# Patient Record
Sex: FEMALE | Race: WHITE | NOT HISPANIC OR LATINO | URBAN - METROPOLITAN AREA
[De-identification: names, ages, dates, MRNs, and addresses within clinical notes are randomized per-mention and may not be internally consistent; named-entity substitution may affect disease eponyms.]

---

## 2018-06-10 ENCOUNTER — INPATIENT (INPATIENT)
Facility: HOSPITAL | Age: 53
LOS: 0 days | Discharge: HOME | DRG: 310 | End: 2018-06-11
Attending: INTERNAL MEDICINE | Admitting: INTERNAL MEDICINE
Payer: COMMERCIAL

## 2018-06-10 VITALS
DIASTOLIC BLOOD PRESSURE: 75 MMHG | WEIGHT: 293 LBS | HEART RATE: 146 BPM | OXYGEN SATURATION: 100 % | TEMPERATURE: 98 F | SYSTOLIC BLOOD PRESSURE: 146 MMHG | RESPIRATION RATE: 20 BRPM

## 2018-06-10 DIAGNOSIS — I48.91 UNSPECIFIED ATRIAL FIBRILLATION: ICD-10-CM

## 2018-06-10 LAB
ALBUMIN SERPL ELPH-MCNC: 4.6 G/DL — SIGNIFICANT CHANGE UP (ref 3.3–5)
ALP SERPL-CCNC: 82 U/L — SIGNIFICANT CHANGE UP (ref 40–120)
ALT FLD-CCNC: 22 U/L — SIGNIFICANT CHANGE UP (ref 10–45)
ANION GAP SERPL CALC-SCNC: 16 MMOL/L — SIGNIFICANT CHANGE UP (ref 5–17)
APTT BLD: 29.4 SEC — SIGNIFICANT CHANGE UP (ref 27.5–37.4)
AST SERPL-CCNC: 21 U/L — SIGNIFICANT CHANGE UP (ref 10–40)
BASOPHILS NFR BLD AUTO: 0.1 % — SIGNIFICANT CHANGE UP (ref 0–2)
BILIRUB SERPL-MCNC: 0.4 MG/DL — SIGNIFICANT CHANGE UP (ref 0.2–1.2)
BUN SERPL-MCNC: 30 MG/DL — HIGH (ref 7–23)
CALCIUM SERPL-MCNC: 10.1 MG/DL — SIGNIFICANT CHANGE UP (ref 8.4–10.5)
CHLORIDE SERPL-SCNC: 105 MMOL/L — SIGNIFICANT CHANGE UP (ref 96–108)
CK MB CFR SERPL CALC: 1.8 NG/ML — SIGNIFICANT CHANGE UP (ref 0–6.7)
CK SERPL-CCNC: 74 U/L — SIGNIFICANT CHANGE UP (ref 25–170)
CO2 SERPL-SCNC: 23 MMOL/L — SIGNIFICANT CHANGE UP (ref 22–31)
CREAT SERPL-MCNC: 0.63 MG/DL — SIGNIFICANT CHANGE UP (ref 0.5–1.3)
EOSINOPHIL NFR BLD AUTO: 0 % — SIGNIFICANT CHANGE UP (ref 0–6)
GLUCOSE SERPL-MCNC: 126 MG/DL — HIGH (ref 70–99)
HCT VFR BLD CALC: 44.8 % — SIGNIFICANT CHANGE UP (ref 34.5–45)
HGB BLD-MCNC: 14.7 G/DL — SIGNIFICANT CHANGE UP (ref 11.5–15.5)
INR BLD: 1.04 — SIGNIFICANT CHANGE UP (ref 0.88–1.16)
LIDOCAIN IGE QN: 24 U/L — SIGNIFICANT CHANGE UP (ref 7–60)
LYMPHOCYTES # BLD AUTO: 10 % — LOW (ref 13–44)
MCHC RBC-ENTMCNC: 27.4 PG — SIGNIFICANT CHANGE UP (ref 27–34)
MCHC RBC-ENTMCNC: 32.8 G/DL — SIGNIFICANT CHANGE UP (ref 32–36)
MCV RBC AUTO: 83.4 FL — SIGNIFICANT CHANGE UP (ref 80–100)
MONOCYTES NFR BLD AUTO: 1.9 % — LOW (ref 2–14)
NEUTROPHILS NFR BLD AUTO: 88 % — HIGH (ref 43–77)
NT-PROBNP SERPL-SCNC: 554 PG/ML — HIGH (ref 0–300)
PLATELET # BLD AUTO: 262 K/UL — SIGNIFICANT CHANGE UP (ref 150–400)
POTASSIUM SERPL-MCNC: 4.6 MMOL/L — SIGNIFICANT CHANGE UP (ref 3.5–5.3)
POTASSIUM SERPL-SCNC: 4.6 MMOL/L — SIGNIFICANT CHANGE UP (ref 3.5–5.3)
PROT SERPL-MCNC: 8 G/DL — SIGNIFICANT CHANGE UP (ref 6–8.3)
PROTHROM AB SERPL-ACNC: 11.5 SEC — SIGNIFICANT CHANGE UP (ref 9.8–12.7)
RBC # BLD: 5.37 M/UL — HIGH (ref 3.8–5.2)
RBC # FLD: 13 % — SIGNIFICANT CHANGE UP (ref 10.3–16.9)
SODIUM SERPL-SCNC: 144 MMOL/L — SIGNIFICANT CHANGE UP (ref 135–145)
TROPONIN T SERPL-MCNC: <0.01 NG/ML — SIGNIFICANT CHANGE UP (ref 0–0.01)
WBC # BLD: 11.4 K/UL — HIGH (ref 3.8–10.5)
WBC # FLD AUTO: 11.4 K/UL — HIGH (ref 3.8–10.5)

## 2018-06-10 PROCEDURE — 71045 X-RAY EXAM CHEST 1 VIEW: CPT | Mod: 26

## 2018-06-10 PROCEDURE — 99285 EMERGENCY DEPT VISIT HI MDM: CPT | Mod: 25

## 2018-06-10 PROCEDURE — 93010 ELECTROCARDIOGRAM REPORT: CPT | Mod: 59

## 2018-06-10 RX ORDER — HEPARIN SODIUM 5000 [USP'U]/ML
INJECTION INTRAVENOUS; SUBCUTANEOUS
Qty: 25000 | Refills: 0 | Status: DISCONTINUED | OUTPATIENT
Start: 2018-06-10 | End: 2018-06-10

## 2018-06-10 RX ORDER — HEPARIN SODIUM 5000 [USP'U]/ML
INJECTION INTRAVENOUS; SUBCUTANEOUS
Qty: 25000 | Refills: 0 | Status: DISCONTINUED | OUTPATIENT
Start: 2018-06-10 | End: 2018-06-11

## 2018-06-10 RX ORDER — SODIUM CHLORIDE 9 MG/ML
3 INJECTION INTRAMUSCULAR; INTRAVENOUS; SUBCUTANEOUS ONCE
Qty: 0 | Refills: 0 | Status: COMPLETED | OUTPATIENT
Start: 2018-06-10 | End: 2018-06-10

## 2018-06-10 RX ADMIN — SODIUM CHLORIDE 3 MILLILITER(S): 9 INJECTION INTRAMUSCULAR; INTRAVENOUS; SUBCUTANEOUS at 18:36

## 2018-06-10 RX ADMIN — HEPARIN SODIUM 1800 UNIT(S)/HR: 5000 INJECTION INTRAVENOUS; SUBCUTANEOUS at 22:30

## 2018-06-10 NOTE — H&P ADULT - PROBLEM SELECTOR PLAN 1
-New onset A-Fib with RVR in the setting of recent infection  -WBC 11.4 with left shift, auto neutrophil 88.0%  -Trop negative x 1, , D-Dimer Negative  -Pt given Cardizem 10mg IV x 2 and Cardizem 30mg PO x 1 in ED  -Started pt on Heparin gtt  -Consider EP consult in AM, NPO @ midnight for possible procedure in AM  -Echo ordered  -TFTs ordered -New onset A-Fib with RVR in the setting of recent infection  -WBC 11.4 with left shift, auto neutrophil 88.0%  -Trop negative x 1, , D-Dimer Negative  -Pt given Cardizem 10mg IV x 2 and Cardizem 30mg PO x 1 in ED  -Started pt on Heparin gtt  -Consider EP consult in AM, NPO @ midnight for possible procedure in AM  -Echo ordered  -TFTs ordered  -Monitor Telemetry overnight and EKG in AM    DVT ppx: Heparin gtt  Dispo: Admitted to [facility] for rate control and further w/u

## 2018-06-10 NOTE — ED PROVIDER NOTE - PROGRESS NOTE DETAILS
discussed with dr. ugalde, discussed with patient no contraindications to anti-coagulation, no hx of bleeding, no rectal bleeding no black/bloody stool, no brain bleeding, ulcer hx will give heparin gtt Dosing discussed with pharmacy.

## 2018-06-10 NOTE — H&P ADULT - HISTORY OF PRESENT ILLNESS
52 y/o Female presented to [facility] ED on 6/11/18 c/o sudden onset of palpitations with associated shortness of breath which began at 3:45 PM this afternoon. Pt states she was treated at an urgent care on Friday 6/8/18 for bronchitis with Doxycycline and Prednisone for chest discomfort and cough. Pt states she noted improvement of cough and SOB prior to this episode today. Pt denies chest pain, leg swelling. In ED, VS T 98, , /75, RR 20, O2 100% on RA. Labs significant for WBC 11.4 with left shift, Auto Neutrophils 88.0%, D-Dimer Negative, Trop Negative x 1, , BUN 30, Cr 0.63. EKG showed A-fib with RVR, CXR showed... Pt given Cardizem 10mg IV x 2 and Cardizem 30mg PO x 1. 52 y/o Female presented to [facility] ED on 6/11/18 c/o sudden onset of palpitations with associated shortness of breath which began at 3:45 PM this afternoon. Pt states she was treated at an urgent care on Friday 6/8/18 for bronchitis with Doxycycline and Prednisone for chest discomfort and cough. Pt states she noted improvement of cough and SOB prior to this episode today. Pt denies chest pain, leg swelling. In ED, VS T 98, , /75, RR 20, O2 100% on RA. Labs significant for WBC 11.4 with left shift, Auto Neutrophils 88.0%, D-Dimer Negative, Trop Negative x 1, , BUN 30, Cr 0.63. EKG showed A-fib with RVR, CXR showed... Pt given Cardizem 10mg IV x 2 and Cardizem 30mg PO x 1. Pt is now admitted to [facility] for further management of new onset A-Fib with RVR in the setting of recently diagnosed infection. 52 y/o Female with no significant PMHx presented to [facility] ED on 6/11/18 c/o sudden onset of palpitations with associated shortness of breath which began at 3:45 PM this afternoon. Pt states she was treated at an urgent care on Friday 6/8/18 for bronchitis with Doxycycline and Prednisone for chest discomfort and cough. Pt states she noted improvement of cough and SOB prior today's episode of palpitations and SOB. Pt returned to urgent care today and was found to be in A-Fib with RVR Pt denies chest pain, leg swelling. In ED, VS T 98, , /75, RR 20, O2 100% on RA. Labs significant for WBC 11.4 with left shift, Auto Neutrophils 88.0%, D-Dimer Negative, Trop Negative x 1, , BUN 30, Cr 0.63. EKG showed A-fib with RVR @ 132 BPM, CXR negative. Pt given Cardizem 10mg IV x 2 and Cardizem 30mg PO x 1. Pt is now admitted to [facility] for further management of new onset A-Fib with RVR in the setting of recently diagnosed infection. 52 y/o Female with no significant PMHx presented to [facility] ED on 6/11/18 c/o sudden onset of palpitations with associated shortness of breath which began at 3:45 PM this afternoon. Pt states she was treated at an urgent care on Friday 6/8/18 for bronchitis with Doxycycline and Prednisone for chest discomfort and cough. Pt states she noted improvement of cough and SOB prior today's episode of palpitations and SOB. Pt returned to urgent care today for new onset palpitations and was found to be in A-Fib with RVR. Pt denies chest pain, HA, dizziness, orthopnea/PND, dysuria, hematuria, melena, BRBPR, n/v/d, leg swelling, or any other symptoms at this time. In ED, VS T 98, , /75, RR 20, O2 100% on RA. Labs significant for WBC 11.4 with left shift, Auto Neutrophils 88.0%, D-Dimer Negative, Trop Negative x 1, , BUN 30, Cr 0.63. EKG showed A-fib with RVR @ 132 BPM, CXR negative. Pt given Cardizem 10mg IV x 2 and Cardizem 30mg PO x 1. Pt is now admitted to [facility] for further management of new onset A-Fib with RVR in the setting of recently diagnosed infection.

## 2018-06-10 NOTE — ED ADULT NURSE NOTE - OBJECTIVE STATEMENT
Patient is 52 yo F BIBA, tachycardic (HR 140s), complaining of SOB and palpitations that started at approximately 3:45PM today.  Patient denies any CP, dizziness, N/V/D, fevers, chills, cough, abdominal pain or any other complaints.  PIV placed, labs drawn and sent, EKG done, placed on cardiac monitor.  Will continue with plan of care.

## 2018-06-10 NOTE — H&P ADULT - NSHPREVIEWOFSYSTEMS_GEN_ALL_CORE
GENERAL, CONSTITUTIONAL : denies recent weight loss, fever, chills  EYES, VISION: denies changes in vision   EARS, NOSE, THROAT: denies hearing loss  HEART, CARDIOVASCULAR: denies chest pain, arrhythmia,  admits to palpitations,   RESPIRATORY:admits to productive cough which is resolving, denies SOB, wheezing, PND, orthopnea  GASTROINTESTINAL: Denies abdominal pain, heartburn, bloody stool, dark tarry stool  GENITOURINARY: Denies frequent urination, urgency  MUSCULOSKELETAL denies joint pain or swelling, restricted motion, musculoskeletal pain.   SKIN & INTEGUMENTARY Denies rashes, sores, blisters, blisters, growths.  NEUROLOGICAL: Denies numbness or tingling sensations, sensation loss, burning.   PSYCHIATRIC: Denies nervousness, anxiety, depression  ENDOCRINE Denies heat or cold intolerance, excessive thirst  HEMATOLOGIC/LYMPHATIC: Denies abnormal bleeding, bleeding of any kind

## 2018-06-10 NOTE — H&P ADULT - ASSESSMENT
52 y/o Female with no significant PMHx presented to [facility] ED on 6/11/18 c/o sudden onset of palpitations with associated shortness of breath which began at 3:45 PM this afternoon. Pt states she was treated at an urgent care on Friday 6/8/18 for bronchitis with Doxycycline and Prednisone for chest discomfort and cough. Pt states she noted improvement of cough and SOB prior today's episode of palpitations and SOB. Pt returned to urgent care today for new onset palpitations and was found to be in A-Fib with RVR. Pt is now admitted to [facility] for further management of new onset A-Fib with RVR in the setting of recently diagnosed infection.

## 2018-06-10 NOTE — ED PROVIDER NOTE - MEDICAL DECISION MAKING DETAILS
53F with concern for afib with RVR, sudden onset, etiology could potentially include recent diagnosed infection, pt did not use albuterol, no underlying cardiac dz, asymptomatic, no chest pain, trop neg, bnp slight elevation, discussed with dr. ugalde. Will send d-dimer, negative, doubt PE will admit for further w/u.

## 2018-06-10 NOTE — ED ADULT NURSE REASSESSMENT NOTE - NS ED NURSE REASSESS COMMENT FT1
Rec'd pt calm, a+o x 3, in stable condition. Pt breathing with ease on room air. Pt on cardiac monitor, noted with irregular HR ranging from 116-128per min. Denies CP/ SOB at this time. MD aware. Pt medicated as ordered. Will follow up. HL 18 G RAC/18 G L hand noted, patent. Pt admitted, awaiting dispo. Close monitoring continues.

## 2018-06-10 NOTE — ED PROVIDER NOTE - PHYSICAL EXAMINATION
gen: no acute distress, comfortable, conversant  HEENT: oropharynx clear  Neck: supple, no meningismus, no bruit noted bl carotid  CV: tachycardic, irregular  Resp: ctab, no w/c/r  Abd: nontender, no rebound/guarding  Ext: no edema, pedal pulses 2+  Neuro: alert and oriented, cn grossly intact, strength equal in all 4 ext, sensation intact to light touch f/a/l, nl coordination, gait steady

## 2018-06-10 NOTE — H&P ADULT - ATTENDING COMMENTS
A/ New onset AF in setting of recent URI/bronchitis  -plan for rate control with cardizem, anticoagulation with heparin  -Ddimer negative  -consider MARIELA/DCCV depending on tte  likely candidate for a noac prior to dc

## 2018-06-10 NOTE — H&P ADULT - NSHPPHYSICALEXAM_GEN_ALL_CORE
T(C): 36.6 (06-11-18 @ 00:38), Max: 36.7 (06-10-18 @ 18:00)  HR: 108 (06-11-18 @ 00:20) (108 - 146)  BP: 123/90 (06-11-18 @ 00:20) (123/90 - 146/75)  RR: 18 (06-11-18 @ 00:20) (17 - 20)  SpO2: 95% (06-11-18 @ 00:20) (94% - 100%)  Wt(kg): --    Appearance: Normal	  HEENT:   Normal oral mucosa, PERRL, EOMI	  Neck: Supple, - JVD; No Carotid Bruit and 2+ pulses B/L  Cardiovascular: Irregular, irregular, No JVD, No murmurs  Respiratory: Lungs clear to auscultation//No Rales, Rhonchi, Wheezing	  Gastrointestinal:  Soft, Non-tender, + BS	  Skin: No rashes, No ecchymoses, No cyanosis  Extremities: Normal range of motion, No clubbing, cyanosis or edema  Vascular: Femoral pulses 2+ b/l without bruit, DP 1+ b/l, PT 1+ b/l  Neurologic: Non-focal  Psychiatry: A & O x 3, Mood & affect appropriate

## 2018-06-10 NOTE — ED PROVIDER NOTE - OBJECTIVE STATEMENT
53F with shortness of breath, palpitations sudden onset at 3:45 PM, upgraded to me for afib with RVR. Pt denies chest pain, leg swelling. Recently visited urgent care on Friday and today, on Friday was diagnosed with bronchitis and given doxycycline prednisone for chest discomfort, cough. No cardiac hx. Felt sob and cough were improving with meds. Did not use any inhalers.

## 2018-06-11 ENCOUNTER — TRANSCRIPTION ENCOUNTER (OUTPATIENT)
Age: 53
End: 2018-06-11

## 2018-06-11 VITALS
TEMPERATURE: 97 F | DIASTOLIC BLOOD PRESSURE: 70 MMHG | HEART RATE: 89 BPM | OXYGEN SATURATION: 95 % | SYSTOLIC BLOOD PRESSURE: 136 MMHG | RESPIRATION RATE: 18 BRPM

## 2018-06-11 LAB
ALBUMIN SERPL ELPH-MCNC: 4.4 G/DL — SIGNIFICANT CHANGE UP (ref 3.3–5)
ALP SERPL-CCNC: 78 U/L — SIGNIFICANT CHANGE UP (ref 40–120)
ALT FLD-CCNC: 19 U/L — SIGNIFICANT CHANGE UP (ref 10–45)
ANION GAP SERPL CALC-SCNC: 17 MMOL/L — SIGNIFICANT CHANGE UP (ref 5–17)
APPEARANCE UR: CLEAR — SIGNIFICANT CHANGE UP
APTT BLD: 61.2 SEC — HIGH (ref 27.5–37.4)
APTT BLD: 65.9 SEC — HIGH (ref 27.5–37.4)
AST SERPL-CCNC: 19 U/L — SIGNIFICANT CHANGE UP (ref 10–40)
BILIRUB DIRECT SERPL-MCNC: <0.2 MG/DL — SIGNIFICANT CHANGE UP (ref 0–0.2)
BILIRUB INDIRECT FLD-MCNC: >0.4 MG/DL — SIGNIFICANT CHANGE UP (ref 0.2–1)
BILIRUB SERPL-MCNC: 0.6 MG/DL — SIGNIFICANT CHANGE UP (ref 0.2–1.2)
BILIRUB UR-MCNC: NEGATIVE — SIGNIFICANT CHANGE UP
BUN SERPL-MCNC: 19 MG/DL — SIGNIFICANT CHANGE UP (ref 7–23)
CALCIUM SERPL-MCNC: 10 MG/DL — SIGNIFICANT CHANGE UP (ref 8.4–10.5)
CHLORIDE SERPL-SCNC: 101 MMOL/L — SIGNIFICANT CHANGE UP (ref 96–108)
CHOLEST SERPL-MCNC: 216 MG/DL — HIGH (ref 10–199)
CHOLEST SERPL-MCNC: 218 MG/DL — HIGH (ref 10–199)
CK MB CFR SERPL CALC: 1.8 NG/ML — SIGNIFICANT CHANGE UP (ref 0–6.7)
CK SERPL-CCNC: 58 U/L — SIGNIFICANT CHANGE UP (ref 25–170)
CO2 SERPL-SCNC: 23 MMOL/L — SIGNIFICANT CHANGE UP (ref 22–31)
COLOR SPEC: YELLOW — SIGNIFICANT CHANGE UP
CREAT SERPL-MCNC: 0.5 MG/DL — SIGNIFICANT CHANGE UP (ref 0.5–1.3)
DIFF PNL FLD: ABNORMAL
GLUCOSE SERPL-MCNC: 112 MG/DL — HIGH (ref 70–99)
GLUCOSE UR QL: NEGATIVE — SIGNIFICANT CHANGE UP
HBA1C BLD-MCNC: 5.1 % — SIGNIFICANT CHANGE UP (ref 4–5.6)
HCT VFR BLD CALC: 41.6 % — SIGNIFICANT CHANGE UP (ref 34.5–45)
HDLC SERPL-MCNC: 72 MG/DL — SIGNIFICANT CHANGE UP (ref 40–125)
HDLC SERPL-MCNC: 75 MG/DL — SIGNIFICANT CHANGE UP (ref 40–125)
HGB BLD-MCNC: 14.4 G/DL — SIGNIFICANT CHANGE UP (ref 11.5–15.5)
KETONES UR-MCNC: NEGATIVE — SIGNIFICANT CHANGE UP
LEUKOCYTE ESTERASE UR-ACNC: NEGATIVE — SIGNIFICANT CHANGE UP
LIPID PNL WITH DIRECT LDL SERPL: 113 MG/DL — SIGNIFICANT CHANGE UP
LIPID PNL WITH DIRECT LDL SERPL: 116 MG/DL — SIGNIFICANT CHANGE UP
MAGNESIUM SERPL-MCNC: 2.3 MG/DL — SIGNIFICANT CHANGE UP (ref 1.6–2.6)
MCHC RBC-ENTMCNC: 28.3 PG — SIGNIFICANT CHANGE UP (ref 27–34)
MCHC RBC-ENTMCNC: 34.6 G/DL — SIGNIFICANT CHANGE UP (ref 32–36)
MCV RBC AUTO: 81.9 FL — SIGNIFICANT CHANGE UP (ref 80–100)
NITRITE UR-MCNC: NEGATIVE — SIGNIFICANT CHANGE UP
PH UR: 5.5 — SIGNIFICANT CHANGE UP (ref 5–8)
PLATELET # BLD AUTO: 248 K/UL — SIGNIFICANT CHANGE UP (ref 150–400)
POTASSIUM SERPL-MCNC: 4 MMOL/L — SIGNIFICANT CHANGE UP (ref 3.5–5.3)
POTASSIUM SERPL-SCNC: 4 MMOL/L — SIGNIFICANT CHANGE UP (ref 3.5–5.3)
PROT SERPL-MCNC: 7.6 G/DL — SIGNIFICANT CHANGE UP (ref 6–8.3)
PROT UR-MCNC: NEGATIVE MG/DL — SIGNIFICANT CHANGE UP
RBC # BLD: 5.08 M/UL — SIGNIFICANT CHANGE UP (ref 3.8–5.2)
RBC # FLD: 13 % — SIGNIFICANT CHANGE UP (ref 10.3–16.9)
SODIUM SERPL-SCNC: 141 MMOL/L — SIGNIFICANT CHANGE UP (ref 135–145)
SP GR SPEC: 1.02 — SIGNIFICANT CHANGE UP (ref 1–1.03)
T3FREE SERPL-MCNC: 1.83 PG/ML — SIGNIFICANT CHANGE UP (ref 1.71–3.71)
T4 FREE SERPL-MCNC: 0.94 NG/DL — SIGNIFICANT CHANGE UP (ref 0.7–1.48)
TOTAL CHOLESTEROL/HDL RATIO MEASUREMENT: 2.9 RATIO — LOW (ref 3.3–7.1)
TOTAL CHOLESTEROL/HDL RATIO MEASUREMENT: 3 RATIO — LOW (ref 3.3–7.1)
TRIGL SERPL-MCNC: 125 MG/DL — SIGNIFICANT CHANGE UP (ref 10–149)
TRIGL SERPL-MCNC: 164 MG/DL — HIGH (ref 10–149)
TROPONIN T SERPL-MCNC: <0.01 NG/ML — SIGNIFICANT CHANGE UP (ref 0–0.01)
TSH SERPL-MCNC: 0.56 UIU/ML — SIGNIFICANT CHANGE UP (ref 0.35–4.94)
TSH SERPL-MCNC: 0.82 UIU/ML — SIGNIFICANT CHANGE UP (ref 0.35–4.94)
UROBILINOGEN FLD QL: 0.2 E.U./DL — SIGNIFICANT CHANGE UP
WBC # BLD: 9.1 K/UL — SIGNIFICANT CHANGE UP (ref 3.8–10.5)
WBC # FLD AUTO: 9.1 K/UL — SIGNIFICANT CHANGE UP (ref 3.8–10.5)

## 2018-06-11 PROCEDURE — 84439 ASSAY OF FREE THYROXINE: CPT

## 2018-06-11 PROCEDURE — 99235 HOSP IP/OBS SAME DATE MOD 70: CPT

## 2018-06-11 PROCEDURE — 92960 CARDIOVERSION ELECTRIC EXT: CPT

## 2018-06-11 PROCEDURE — 93306 TTE W/DOPPLER COMPLETE: CPT

## 2018-06-11 PROCEDURE — 83690 ASSAY OF LIPASE: CPT

## 2018-06-11 PROCEDURE — 82550 ASSAY OF CK (CPK): CPT

## 2018-06-11 PROCEDURE — C8925: CPT

## 2018-06-11 PROCEDURE — 83880 ASSAY OF NATRIURETIC PEPTIDE: CPT

## 2018-06-11 PROCEDURE — 83036 HEMOGLOBIN GLYCOSYLATED A1C: CPT

## 2018-06-11 PROCEDURE — 85027 COMPLETE CBC AUTOMATED: CPT

## 2018-06-11 PROCEDURE — 85730 THROMBOPLASTIN TIME PARTIAL: CPT

## 2018-06-11 PROCEDURE — 93312 ECHO TRANSESOPHAGEAL: CPT | Mod: 26

## 2018-06-11 PROCEDURE — 99285 EMERGENCY DEPT VISIT HI MDM: CPT | Mod: 25

## 2018-06-11 PROCEDURE — 83735 ASSAY OF MAGNESIUM: CPT

## 2018-06-11 PROCEDURE — 81001 URINALYSIS AUTO W/SCOPE: CPT

## 2018-06-11 PROCEDURE — 36415 COLL VENOUS BLD VENIPUNCTURE: CPT

## 2018-06-11 PROCEDURE — 84481 FREE ASSAY (FT-3): CPT

## 2018-06-11 PROCEDURE — 87086 URINE CULTURE/COLONY COUNT: CPT

## 2018-06-11 PROCEDURE — 93321 DOPPLER ECHO F-UP/LMTD STD: CPT | Mod: 26,59

## 2018-06-11 PROCEDURE — 96374 THER/PROPH/DIAG INJ IV PUSH: CPT

## 2018-06-11 PROCEDURE — 80053 COMPREHEN METABOLIC PANEL: CPT

## 2018-06-11 PROCEDURE — 96376 TX/PRO/DX INJ SAME DRUG ADON: CPT

## 2018-06-11 PROCEDURE — 82553 CREATINE MB FRACTION: CPT

## 2018-06-11 PROCEDURE — 93306 TTE W/DOPPLER COMPLETE: CPT | Mod: 26,59

## 2018-06-11 PROCEDURE — 80061 LIPID PANEL: CPT

## 2018-06-11 PROCEDURE — 71045 X-RAY EXAM CHEST 1 VIEW: CPT

## 2018-06-11 PROCEDURE — 84443 ASSAY THYROID STIM HORMONE: CPT

## 2018-06-11 PROCEDURE — 93325 DOPPLER ECHO COLOR FLOW MAPG: CPT | Mod: 26,59

## 2018-06-11 PROCEDURE — 82248 BILIRUBIN DIRECT: CPT

## 2018-06-11 PROCEDURE — 99223 1ST HOSP IP/OBS HIGH 75: CPT

## 2018-06-11 PROCEDURE — 85379 FIBRIN DEGRADATION QUANT: CPT

## 2018-06-11 PROCEDURE — 85025 COMPLETE CBC W/AUTO DIFF WBC: CPT

## 2018-06-11 PROCEDURE — 93005 ELECTROCARDIOGRAM TRACING: CPT

## 2018-06-11 PROCEDURE — 84484 ASSAY OF TROPONIN QUANT: CPT

## 2018-06-11 PROCEDURE — 85610 PROTHROMBIN TIME: CPT

## 2018-06-11 RX ORDER — APIXABAN 2.5 MG/1
1 TABLET, FILM COATED ORAL
Qty: 60 | Refills: 3 | OUTPATIENT
Start: 2018-06-11 | End: 2018-10-08

## 2018-06-11 RX ORDER — DILTIAZEM HCL 120 MG
1 CAPSULE, EXT RELEASE 24 HR ORAL
Qty: 30 | Refills: 2 | OUTPATIENT
Start: 2018-06-11 | End: 2018-09-08

## 2018-06-11 RX ORDER — APIXABAN 2.5 MG/1
5 TABLET, FILM COATED ORAL ONCE
Qty: 0 | Refills: 0 | Status: COMPLETED | OUTPATIENT
Start: 2018-06-11 | End: 2018-06-11

## 2018-06-11 RX ADMIN — HEPARIN SODIUM 1800 UNIT(S)/HR: 5000 INJECTION INTRAVENOUS; SUBCUTANEOUS at 05:01

## 2018-06-11 RX ADMIN — HEPARIN SODIUM 1800 UNIT(S)/HR: 5000 INJECTION INTRAVENOUS; SUBCUTANEOUS at 10:49

## 2018-06-11 RX ADMIN — APIXABAN 5 MILLIGRAM(S): 2.5 TABLET, FILM COATED ORAL at 18:28

## 2018-06-11 NOTE — DISCHARGE NOTE ADULT - CARE PROVIDERS DIRECT ADDRESSES
[email or web address]

## 2018-06-11 NOTE — DISCHARGE NOTE ADULT - CARE PROVIDER_API CALL
Kaye Hirsch), Cardiology; Internal Medicine  [address] [address]  Phone: [phone]  Fax: [phone]    Tayo Lewis), Cardiac Electrophysiology; Cardiovascular Disease; Internal Medicine  [address]  [address] [place] [address]  Phone: [phone]  Fax: [phone]

## 2018-06-11 NOTE — DISCHARGE NOTE ADULT - PLAN OF CARE
You came to the hospital with palpitations and were found to be in an irregular heart rhythm. You were given medication to slow the heart rate down and underwent a procedure to shock your heart back to a normal rhythm. You need to take Eliquis 5mg by mouth every 12 hours to help prevent stroke and Cardizem 120mg daily. Both prescriptions were sent to your pharmacy. Please follow up with Dr. Lewis in 5 weeks for a check up.

## 2018-06-11 NOTE — DISCHARGE NOTE ADULT - HOSPITAL COURSE
52 y/o Female with no significant PMHx presented to [facility] ED on 6/11/18 c/o sudden onset of palpitations with associated shortness of breath which began at 3:45 PM this afternoon. Pt states she was treated at an urgent care on Friday 6/8/18 for bronchitis with Doxycycline and Prednisone for chest discomfort and cough. Pt states she noted improvement of cough and SOB prior today's episode of palpitations and SOB. Pt returned to urgent care today for new onset palpitations and was found to be in A-Fib with RVR. Pt denies chest pain, HA, dizziness, orthopnea/PND, dysuria, hematuria, melena, BRBPR, n/v/d, leg swelling, or any other symptoms at this time. In ED, VS T 98, , /75, RR 20, O2 100% on RA. Labs significant for WBC 11.4 with left shift, Auto Neutrophils 88.0%, D-Dimer Negative, Trop Negative x 1, , BUN 30, Cr 0.63. EKG showed A-fib with RVR @ 132 BPM, CXR negative. Pt given Cardizem 10mg IV x 2 and Cardizem 30mg PO x 1. Pt is now admitted to [facility] for further management of new onset A-Fib with RVR in the setting of recently diagnosed infection. Pt CE neg x 2, Thyroid fxn nml. Pt was started on Cardizem 30mg q8hrs with HR improving to 90s-110s. EP was consulted and pt underwent TTE which showed EF 60-65%, nml LA size, and successful MARIELA showing no clots and DCCV to NSR 60s. As d/w EP pt to c/w Cardizem 120mg long acting daily as well as Eliquis 5mg BID with f/u in Dr. Lewis's office in 5 weeks. Pt was given first dose of Eliquis 5mg prior to d/c and as d/w o/p pharmacy copay of $104.53 monthly for Eliquis and Cardizem with $ 11.99/month. Pt is agreeable for paying copay and no prior authorization required. All d/c ppw signed and in chart. 54 y/o Female with no significant PMHx presented to [facility] ED on 6/11/18 c/o sudden onset of palpitations with associated shortness of breath which began at 3:45 PM this afternoon. Pt states she was treated at an urgent care on Friday 6/8/18 for bronchitis with Doxycycline and Prednisone for chest discomfort and cough. Pt states she noted improvement of cough and SOB prior today's episode of palpitations and SOB. Pt returned to urgent care today for new onset palpitations and was found to be in A-Fib with RVR. Pt denies chest pain, HA, dizziness, orthopnea/PND, dysuria, hematuria, melena, BRBPR, n/v/d, leg swelling, or any other symptoms at this time. In ED, VS T 98, , /75, RR 20, O2 100% on RA. Labs significant for WBC 11.4 with left shift, Auto Neutrophils 88.0%, D-Dimer Negative, Trop Negative x 1, , BUN 30, Cr 0.63. EKG showed A-fib with RVR @ 132 BPM, CXR negative. Pt given Cardizem 10mg IV x 2 and Cardizem 30mg PO x 1. Pt is now admitted to [facility] for further management of new onset A-Fib with RVR in the setting of recently diagnosed infection. Pt CE neg x 2, Thyroid fxn nml. Pt was started on Cardizem 30mg q8hrs with HR improving to 90s-110s. EP was consulted and pt underwent TTE which showed EF 60-65%, nml LA size, and successful MARIELA showing no clots and DCCV to NSR 60s. As d/w EP and Dr. Hirsch pt to c/w Cardizem 120mg long acting daily as well as Eliquis 5mg BID with f/u in Dr. Lewis's office in 5 weeks. Pt was given first dose of Eliquis 5mg prior to d/c and as d/w o/p pharmacy copay of $104.53 monthly for Eliquis and Cardizem with $ 11.99/month. Pt is agreeable for paying copay and no prior authorization required. All d/c ppw signed and in chart.

## 2018-06-11 NOTE — DISCHARGE NOTE ADULT - PATIENT PORTAL LINK FT
You can access the [facility] Patient Portal, offered by [facility], by registering with the following website: [email or web address]

## 2018-06-11 NOTE — DISCHARGE NOTE ADULT - MEDICATION SUMMARY - MEDICATIONS TO TAKE
I will START or STAY ON the medications listed below when I get home from the hospital:    Cardizem  mg/24 hours oral capsule, extended release  -- 1 cap(s) by mouth once a day   -- It is very important that you take or use this exactly as directed.  Do not skip doses or discontinue unless directed by your doctor.  Some non-prescription drugs may aggravate your condition.  Read all labels carefully.  If a warning appears, check with your doctor before taking.  Swallow whole.  Do not crush.    -- Indication: For Atrial fibrillation with rapid ventricular response    Eliquis 5 mg oral tablet  -- 1 tab(s) by mouth 2 times a day   -- Check with your doctor before becoming pregnant.  It is very important that you take or use this exactly as directed.  Do not skip doses or discontinue unless directed by your doctor.  Obtain medical advice before taking any non-prescription drugs as some may affect the action of this medication.    -- Indication: For Atrial fibrillation with rapid ventricular response

## 2018-06-11 NOTE — CONSULT NOTE ADULT - SUBJECTIVE AND OBJECTIVE BOX
HPI:  54 y/o Female with no significant past medical history who was admitted to [facility] 6/10 with newly diagnosed symptomatic atrial fibrillation with a rapid ventricular rate, who presents for follow up.    She states she has a history of minor palpitations; but they are never sustained.  He denies any precipitating or alleviating factors.  Last week she was SOB presented to [facility] ED on 6/11/18 c/o sudden onset of palpitations with associated shortness of breath which began at 3:45 PM this afternoon. Pt states she was treated at an urgent care on Friday 6/8/18 for bronchitis with Doxycycline and Prednisone for chest discomfort and cough. Pt states she noted improvement of cough and SOB prior today's episode of palpitations and SOB. Pt returned to urgent care today for new onset palpitations and was found to be in A-Fib with RVR. Pt denies chest pain, HA, dizziness, orthopnea/PND, dysuria, hematuria, melena, BRBPR, n/v/d, leg swelling, or any other symptoms at this time. In ED, VS T 98, , /75, RR 20, O2 100% on RA. Labs significant for WBC 11.4 with left shift, Auto Neutrophils 88.0%, D-Dimer Negative, Trop Negative x 1, , BUN 30, Cr 0.63. EKG showed A-fib with RVR @ 132 BPM, CXR negative. Pt given Cardizem 10mg IV x 2 and Cardizem 30mg PO x 1. Pt is now admitted to [facility] for further management of new onset A-Fib with RVR in the setting of recently diagnosed infection.      PAST MEDICAL & SURGICAL HISTORY:  No significant past medical history  No significant past surgical history          Social History:  Smoking  Drugs  ETOH    pertinent home medications:    Inpatient Medications:   diltiazem    Tablet 30 milliGRAM(s) Oral every 8 hours  heparin  Infusion.  Unit(s)/Hr IV Continuous <Continuous>      No Known Allergies      ROS:   CONSTITUTIONAL: No fever, weight loss + fatigue  EYES: Pt denies  RESPIRATORY: No cough, wheezing, chills or hemoptysis; No Shortness of Breath  CARDIOVASCULAR: see HPI  GASTROINTESTINAL: Pt denies  NEUROLOGICAL: Pt denies  SKIN: Pt denies   PSYCHIATRIC: Pt denies  HEME/LYMPH: Pt denies    PHYSICAL:  T(C): 36 (06-11-18 @ 08:30), Max: 36.7 (06-10-18 @ 18:00)  HR: 89 (06-11-18 @ 08:30) (84 - 146)  BP: 136/70 (06-11-18 @ 08:30) (114/76 - 146/75)  RR: 18 (06-11-18 @ 08:30) (17 - 20)  SpO2: 95% (06-11-18 @ 08:30) (93% - 100%)  Wt(kg): --  Appearance: NAD  Cardiovascular: Normal S1 S2, No JVD, No murmurs, No edema  Respiratory: Lungs clear to auscultation	bilaterally.  No wheeze, rhonchi, rales  Gastrointestinal:  Soft, NT/ND, + BS	  Neurologic: A&O x 3, No deficit noted  Extremities: No edema, pulses intact      LABS:                        14.4   9.1   )-----------( 248      ( 11 Jun 2018 04:34 )             41.6     06-11    141  |  101  |  19  ----------------------------<  112<H>  4.0   |  23  |  0.50    Ca    10.0      11 Jun 2018 05:08  Mg     2.3     06-11    TPro  7.6  /  Alb  4.4  /  TBili  0.6  /  DBili  <0.2  /  AST  19  /  ALT  19  /  AlkPhos  78  06-11    PT/INR - ( 10 Carlo 2018 18:38 )   PT: 11.5 sec;   INR: 1.04          PTT - ( 11 Jun 2018 04:34 )  PTT:61.2 sec  TSH  Troponin   LIVER FUNCTIONS - ( 11 Jun 2018 05:08 )  Alb: 4.4 g/dL / Pro: 7.6 g/dL / ALK PHOS: 78 U/L / ALT: 19 U/L / AST: 19 U/L / GGT: x             EKG:    Telemetry:    ECHO:    Prior EP procedures:    Cath / stress / Cardiac CTa:    Physician Assistant Assessment Plan: HPI:  54 y/o Female with no significant past medical history who was admitted to [facility] 6/10 with newly diagnosed symptomatic atrial fibrillation with a rapid ventricular rate, who presents for follow up.    She states she has a history of minor palpitations; but they are never sustained.  He denies any precipitating or alleviating factors.  She denies any history of CAD/stents, CVA, bleeding issues, diabetes, thyroid issues, HTN.  She admits to snoring and has never been tested for sleep apnea.  She states last week she had some SOB and was treated for bronchitis.  She was placed on prednisone and doxycycline.  Yesterday she states her SOB came back and then a couple of hours later she noted sustained palpitations.  She went to urgent care and was found to be in newly diagnosed atrial fibrillation and was sent to the ER.  She has been rate controlled with cardizem and notes some occasional palpitations.  She denies any lightheadedness, syncope, chest pain, peripheral edema, orthopnea.  At baseline she has no SOB/MCNEIL.  On admission WBC count elevated to 11 and now WNL.  Thyroid panel WNL,    PAST MEDICAL & SURGICAL HISTORY:  No significant past medical history  No significant past surgical history    Social History:  denies smoking/drugs/etoh       Inpatient Medications:   diltiazem    Tablet 30 milliGRAM(s) Oral every 8 hours  heparin  Infusion.  Unit(s)/Hr IV Continuous <Continuous>      No Known Allergies      ROS:   CONSTITUTIONAL: No fever, weight loss + fatigue  RESPIRATORY: see HPI  CARDIOVASCULAR: see HPI  GASTROINTESTINAL: Pt denies  NEUROLOGICAL: Pt denies  PSYCHIATRIC: Pt denies  HEME/LYMPH: Pt denies    PHYSICAL:  T(C): 36 (06-11-18 @ 08:30), Max: 36.7 (06-10-18 @ 18:00)  HR: 89 (06-11-18 @ 08:30) (84 - 146)  BP: 136/70 (06-11-18 @ 08:30) (114/76 - 146/75)  RR: 18 (06-11-18 @ 08:30) (17 - 20)  SpO2: 95% (06-11-18 @ 08:30) (93% - 100%)     Appearance: NAD  Cardiovascular: irregularly irregular. no edema  Respiratory: Lungs clear to auscultation   Neurologic: A&O x 3, No deficit noted  Extremities: No edema     LABS:                        14.4   9.1   )-----------( 248      ( 11 Jun 2018 04:34 )             41.6     141  |  101  |  19  ----------------------------<  112<H>  4.0   |  23  |  0.50    Mg     2.3     TPro  7.6  /  Alb  4.4  /  TBili  0.6  /  DBili  <0.2  /  AST  19  /  ALT  19  /  AlkPhos  78  PT: 11.5 sec;   INR: 1.04   PTT:61.2 sec     EKG: atrial fibrillation     Telemetry: now in afib -120s    ECHO: pending    Prior EP procedures: denies     Cath / stress / Cardiac CTa: denies     Assessment Plan:  54 y/o Female with no significant past medical history who was admitted to [facility] 6/10 with newly diagnosed symptomatic atrial fibrillation with a rapid ventricular rate, who presents for follow up.  WE discussed what atrial fibrillation is in detail and the natural progression of this arrhythmia.  We spoke about the association with a stroke and she is agreeable to be discharged on a blood thinner (chads score at least 1 - will determine long term need for this as an outpatient).  Thyroid studies WNL.  We spoke about the association between afib and untreated sleep apnea and obesity.  She has a history of snoring and we will arrange an outpatient sleep study.  I have also encouraged her to stay active and attempt to loose weight.  WE spoke about treatment options for atrial fibrillation including MARIELA/cardioversion, rate control, antiarrhythmic medications and ablation.  At this time I would like to see what her EF is LA size are on echo and ultimately would like to get her back into NSR.  We discussed what a cardioversion is in detail and she would like to discuss this with her friend before proceeding.  We will follow up with her to see if she would like to proceed.  Please call [phone] with any questions or concerns HPI:  54 y/o Female with no significant past medical history who was admitted to [facility] 6/10 with newly diagnosed symptomatic atrial fibrillation with a rapid ventricular rate.    She states she has a history of minor palpitations; but they are never sustained.  He denies any precipitating or alleviating factors.  She denies any history of CAD/stents, CVA, bleeding issues, diabetes, thyroid issues, HTN.  She admits to snoring and has never been tested for sleep apnea.  She states last week she had some SOB and was treated for bronchitis.  She was placed on prednisone and doxycycline.  Yesterday she states her SOB came back and then a couple of hours later she noted sustained palpitations.  She went to urgent care and was found to be in newly diagnosed atrial fibrillation and was sent to the ER.  She has been rate controlled with cardizem and notes some occasional palpitations.  She denies any lightheadedness, syncope, chest pain, peripheral edema, orthopnea.  At baseline she has no SOB/MCNEIL.  On admission WBC count elevated to 11 and now WNL.  Thyroid panel WNL,    PAST MEDICAL & SURGICAL HISTORY:  No significant past medical history  No significant past surgical history    Social History:  denies smoking/drugs/etoh       Inpatient Medications:   diltiazem    Tablet 30 milliGRAM(s) Oral every 8 hours  heparin  Infusion.  Unit(s)/Hr IV Continuous <Continuous>      No Known Allergies      ROS:   CONSTITUTIONAL: No fever, weight loss + fatigue  RESPIRATORY: see HPI  CARDIOVASCULAR: see HPI  GASTROINTESTINAL: Pt denies  NEUROLOGICAL: Pt denies  PSYCHIATRIC: Pt denies  HEME/LYMPH: Pt denies    PHYSICAL:  T(C): 36 (06-11-18 @ 08:30), Max: 36.7 (06-10-18 @ 18:00)  HR: 89 (06-11-18 @ 08:30) (84 - 146)  BP: 136/70 (06-11-18 @ 08:30) (114/76 - 146/75)  RR: 18 (06-11-18 @ 08:30) (17 - 20)  SpO2: 95% (06-11-18 @ 08:30) (93% - 100%)     Appearance: NAD  Cardiovascular: irregularly irregular. no edema  Respiratory: Lungs clear to auscultation   Neurologic: A&O x 3, No deficit noted  Extremities: No edema     LABS:                        14.4   9.1   )-----------( 248      ( 11 Jun 2018 04:34 )             41.6     141  |  101  |  19  ----------------------------<  112<H>  4.0   |  23  |  0.50    Mg     2.3     TPro  7.6  /  Alb  4.4  /  TBili  0.6  /  DBili  <0.2  /  AST  19  /  ALT  19  /  AlkPhos  78  PT: 11.5 sec;   INR: 1.04   PTT:61.2 sec     EKG: atrial fibrillation     Telemetry: now in afib -120s    ECHO: pending    Prior EP procedures: denies     Cath / stress / Cardiac CTa: denies     Assessment Plan:  54 y/o Female with no significant past medical history who was admitted to [facility] 6/10 with newly diagnosed symptomatic atrial fibrillation with a rapid ventricular rate..  WE discussed what atrial fibrillation is in detail and the natural progression of this arrhythmia.  We spoke about the association with a stroke and she is agreeable to be discharged on a blood thinner (chads score at least 1 - will determine long term need for this as an outpatient).  Thyroid studies WNL.  We spoke about the association between afib and untreated sleep apnea and obesity.  She has a history of snoring and we will arrange an outpatient sleep study.  I have also encouraged her to stay active and attempt to loose weight.  WE spoke about treatment options for atrial fibrillation including MARIELA/cardioversion, rate control, antiarrhythmic medications and ablation.  At this time I would like to see what her EF is LA size are on echo and ultimately would like to get her back into NSR.  We discussed what a cardioversion is in detail and she would like to discuss this with her friend before proceeding.  We will follow up with her to see if she would like to proceed.  Please call [phone] with any questions or concerns

## 2018-06-11 NOTE — DISCHARGE NOTE ADULT - CARE PLAN
Principal Discharge DX:	Atrial fibrillation with rapid ventricular response  Goal:	You came to the hospital with palpitations and were found to be in an irregular heart rhythm. You were given medication to slow the heart rate down and underwent a procedure to shock your heart back to a normal rhythm.  Assessment and plan of treatment:	You need to take Eliquis 5mg by mouth every 12 hours to help prevent stroke and Cardizem 120mg daily. Both prescriptions were sent to your pharmacy. Please follow up with Dr. Lewis in 5 weeks for a check up.

## 2018-06-12 LAB
CULTURE RESULTS: NO GROWTH — SIGNIFICANT CHANGE UP
SPECIMEN SOURCE: SIGNIFICANT CHANGE UP

## 2018-06-14 DIAGNOSIS — E66.9 OBESITY, UNSPECIFIED: ICD-10-CM

## 2018-06-14 DIAGNOSIS — I48.91 UNSPECIFIED ATRIAL FIBRILLATION: ICD-10-CM

## 2018-06-14 DIAGNOSIS — J40 BRONCHITIS, NOT SPECIFIED AS ACUTE OR CHRONIC: ICD-10-CM

## 2021-12-31 NOTE — ED ADULT TRIAGE NOTE - AS O2 DELIVERY
Deteriorating patient status - Patient was clinically upgraded due to deteriorating patient status. room air

## 2023-02-13 NOTE — H&P ADULT - PSH
[Postnasal Drip] : postnasal drip [Nasal Discharge] : nasal discharge [Cough] : cough [Negative] : Heme/Lymph [Sore Throat] : no sore throat [Shortness Of Breath] : no shortness of breath [Wheezing] : no wheezing No significant past surgical history

## 2024-05-06 ENCOUNTER — RESULT REVIEW (OUTPATIENT)
Age: 59
End: 2024-05-06

## 2024-05-06 ENCOUNTER — APPOINTMENT (OUTPATIENT)
Dept: ORTHOPEDIC SURGERY | Facility: CLINIC | Age: 59
End: 2024-05-06
Payer: COMMERCIAL

## 2024-05-06 ENCOUNTER — OUTPATIENT (OUTPATIENT)
Dept: OUTPATIENT SERVICES | Facility: HOSPITAL | Age: 59
LOS: 1 days | End: 2024-05-06
Payer: COMMERCIAL

## 2024-05-06 VITALS
TEMPERATURE: 98.8 F | SYSTOLIC BLOOD PRESSURE: 148 MMHG | OXYGEN SATURATION: 96 % | WEIGHT: 293 LBS | DIASTOLIC BLOOD PRESSURE: 89 MMHG | HEART RATE: 66 BPM | HEIGHT: 67 IN | BODY MASS INDEX: 45.99 KG/M2

## 2024-05-06 DIAGNOSIS — M17.0 BILATERAL PRIMARY OSTEOARTHRITIS OF KNEE: ICD-10-CM

## 2024-05-06 DIAGNOSIS — M47.816 SPONDYLOSIS W/OUT MYELOPATHY OR RADICULOPATHY, LUMBAR REGION: ICD-10-CM

## 2024-05-06 PROCEDURE — 73521 X-RAY EXAM HIPS BI 2 VIEWS: CPT

## 2024-05-06 PROCEDURE — 73564 X-RAY EXAM KNEE 4 OR MORE: CPT | Mod: 26,50

## 2024-05-06 PROCEDURE — 73521 X-RAY EXAM HIPS BI 2 VIEWS: CPT | Mod: 26

## 2024-05-06 PROCEDURE — 73522 X-RAY EXAM HIPS BI 3-4 VIEWS: CPT

## 2024-05-06 PROCEDURE — 73564 X-RAY EXAM KNEE 4 OR MORE: CPT | Mod: 50

## 2024-05-06 PROCEDURE — 73564 X-RAY EXAM KNEE 4 OR MORE: CPT

## 2024-05-06 PROCEDURE — 99204 OFFICE O/P NEW MOD 45 MIN: CPT

## 2024-05-06 RX ORDER — CYCLOBENZAPRINE HYDROCHLORIDE 10 MG/1
10 TABLET, FILM COATED ORAL
Qty: 30 | Refills: 0 | Status: ACTIVE | COMMUNITY
Start: 2024-05-06 | End: 1900-01-01

## 2024-05-06 RX ORDER — MELOXICAM 15 MG/1
15 TABLET ORAL DAILY
Qty: 30 | Refills: 1 | Status: ACTIVE | COMMUNITY
Start: 2024-05-06 | End: 1900-01-01

## 2024-05-08 NOTE — END OF VISIT
[FreeTextEntry3] : All medical record entries made by the Katherynibseamus were at my, Dr.Leslie Pabon, direction and personally dictated by me on 05/06/2024. I have reviewed the chart and agree that the record accurately reflects my personal performance of the history, physical exam, assessment and plan. I have also personally directed, reviewed, and agreed with the chart. [Time Spent: ___ minutes] : I have spent [unfilled] minutes of time on the encounter.

## 2024-05-08 NOTE — PHYSICAL EXAM
[de-identified] : General: Well-nourished, well-developed, alert, and in no acute distress. Head: Normocephalic. Eyes: Pupils equal, extraocular muscles intact, normal sclera. Nose: No nasal discharge. Cardiovascular: Extremities are warm and well perfused. Distal pulses are symmetric bilaterally. Respiratory: No labored breathing. Extremities: Sensation is intact distally bilaterally. Distal pulses are symmetric bilaterally Lymphatic: No regional lymphadenopathy, no lymphedema Neurologic: No focal deficits Skin: Normal skin color, texture, and turgor Psychiatric: Normal affect  MSK: Examination of [right] hip: Inspection: No erythema, hematoma or lesions. Gait [slight genu valgum] [unable] to toe walk, heel walk, tandem walk Trendelenburg [negative]   Palpation: Tender to palpation: Nontender to palpation: pubic symphysis, inguinal crease, psoas tendon, along the ischial tuberosity, GT bursa, gluteals, piriformis, SI joint and paraspinals   Range of Motion: Internal rotation: [5] degrees, External rotation: [50] degrees, Flexion [90] degrees   Special tests: Log roll negative SITA [negative] FADIR [negative] Rubio [positive] Ely [negative]   SLR negative. Tight Hamstrings Piriformis compression [negative] ASIS distraction [negative] Iliac compression [negative]   Examination of [left] hip: Inspection: No erythema, hematoma or lesions. Palpation: Nontender to palpation: pubic symphysis, inguinal crease, psoas tendon, GT bursa, along the ischial tuberosity, gluteals, piriformis, SI joint and paraspinals   Range of Motion: Internal rotation: [5] degrees, External rotation: [50] degrees, Flexion [90] degrees   Special tests: SITA negative FADIR negative Rubio positive Ely negative   SLR negative. Tight Hamstrings Piriformis compression [negative] ASIS distraction [negative] Iliac compression [negative]   Examination of [right] knee:   [No] effusion No erythema, hematoma or skin lesion Tender to palpation: medial joint line, lateral joint line, Nontender to palpation: medial patellar facet, lateral patellar facet, quad tendon, patellar tendon, pes, Gerdy's tubercle, tibial tuberosity, popliteal fossa, hamstrings, ITB No warmth No Baker's cyst palpable ROM: 0-90 [No] patellar crepitus   Log roll negative Lachman negative Anterior drawer negative Posterior drawer negative Varus/valgus stress negative at 0 and 30 deg Verna negative Patellar grind negative   Examination of [left]  knee:   No effusion, erythema, hematoma or skin lesion Nontender to palpation: medial joint line, lateral joint line, medial patellar facet, lateral patellar facet, quad tendon, patellar tendon, pes, Gerdy's tubercle, tibial tuberosity, popliteal fossa, hamstrings, ITB No warmth No Baker's cyst palpable ROM: 0-120 No patellar crepitus   Log roll negative Lachman negative Anterior drawer negative Posterior drawer negative Varus/valgus stress negative at 0 and 30 deg Verna negative Patellar grind negative   Sensation is intact to light touch over the superficial and deep peroneal nerve distributions and the posterior tibial nerve distribution. Capillary refill is less than two seconds. Posterior tibial and dorsalis pedis pulses 2+ equal bilaterally. No calf swelling or tenderness bilaterally. Strength testing shows  Hip flexion 5/5, Hip adduction 5/5, Hip abduction 5/5, Knee Extension 5/5, Knee Flexion 5/5, dorsiflexion 5/5, plantar flexion 5/5, EHL 5/5 Reflexes: Patellar 2+, Achilles 2+

## 2024-05-08 NOTE — ASSESSMENT
[FreeTextEntry1] : CYNDY HUNTER is a 59 year old female with left lower extremity pain. I discussed with the patient that their symptoms, signs, and imaging are most consistent with lumbar spondylosis and osteoarthritis. We reviewed the natural history of this condition and treatment options ranging from conservative measures (activity modification, physical therapy, icing, oral anti-inflammatory and/or analgesic medications, steroid injection, HA gel injections, PRP injections) to surgical management. We agreed on the following plan:   XR of bilateral knee and hip was taken and reviewed with the patient today. Activity modification: low impact aerobic activity (stationary bike, elliptical, swimming) Recommend 150 min per week of moderate intensity aerobic activity Start Home Exercises for knee and spine conditioning. Demonstration handout provided. Physical therapy. Referral provided. Medication:  Meloxicam 15 mg daily PRN and Cyclobenzaprine 10 mg HS PRN, prescription provided. Advanced imaging: Consider X-ray of L-spine, MRI if no symptomatic improvement Discussed potential for CSI or HA gel injection if no symptomatic improvement Follow up in 6-8 weeks.

## 2024-05-08 NOTE — ADDENDUM
[FreeTextEntry1] : Documented by Lianna Matthews acting as a scribe for Dr. Maggie Pabon. 05/06/2024.

## 2024-05-08 NOTE — HISTORY OF PRESENT ILLNESS
[de-identified] : CYNDY HUNTER is a 59 year old female presents with left lower extremity pain, initially diagnosed as arthritis in her knee a few years ago. Despite undergoing physical therapy, her pain persisted. Over the last six months, she experienced symptoms resembling sciatica, with pain radiating from her ankle to her hip, rendering her unable to walk or stand for more than a few minutes. This pain subsided after 3-4 weeks with the help of chiropractic care and painkillers but flared up again about 2-3 weeks ago. Pt reports the pain now feels more localized to her left hip, and it becomes unbearable when standing for more than five minutes or walking more than a block. She also experiences significant pain when navigating stairs, particularly when descending, and has limited mobility in her right knee due to inflammation. Occasionally takes Tylenol for severe pain. Pt works as an .

## 2024-05-08 NOTE — DISCUSSION/SUMMARY

## 2024-06-24 ENCOUNTER — APPOINTMENT (OUTPATIENT)
Dept: ORTHOPEDIC SURGERY | Facility: CLINIC | Age: 59
End: 2024-06-24

## 2025-05-12 NOTE — ED ADULT NURSE NOTE - BREATH SOUNDS, MLM
Viktoria Hernández   [id number]   5/12/2025     DERMATOLOGY AT THE [facility]     HISTORY OF PRESENT ILLNESS:     Viktoria is a 48 year old female who presents at the kind request of HUMBLE Joyner for an evaluation of red scaly lesions on her face present for several months. She states that one of the lesions was red, thin skin, flaky and got bigger. She states she ordered Tazorac 0.05 % gel from a foreign pharmacy and states that she feels like that helped.     Additionally, patient expresses desires concern of a dry scaly scalp.     PMHx/PSHx:     Patient denies any personal history of skin cancer and no family history of melanoma.       Reviewed in Epic     Meds:     Reviewed in Epic     PHYSICAL EXAM      Chaperone: Lillie Gray MA    Exam showed a 6 mm inflammatory pink papule along right temple    Exam showed a 3 mm yellowish lobulated papule along left medial cheek consistent with sebaceous hyperplasia.    Exam showed multiple excoriated perifollicular papules along right crown of scalp chest and breasts consistent with folliculitis.    Exam showed 3 mm smooth pink papule along right medial breast consistent with cherry angioma    Exam showed a 6 mm hyperkeratotic tan to light brown papule along right cheek with dermoscopy showing an undulating pattern consistent with seborrheic keratosis    ASSESSMENT/PLAN:     1) neoplasm of skin:    Unfortunately given the inflamed nature provide cannot definitively rule out basal cell carcinoma.  Recommended biopsy for definitive diagnosis versus watchful waiting.    After discussion of the risks and benefits of a shave biopsy to include scarring, patient opts for watchful waiting.    She did express her frustration given the lack of providers ability to render a definitive diagnosis.  Provider explained that the diagnostic criteria which are visible with an adulterated lesions are not present.    Unfortunately patient's use of topical Tazorac has caused a  significant inflammatory response.    -Recommend biopsy for definitive diagnosis but patient declines    2) sebaceous hyperplasia of face:    Exam showed sebaceous hyperplasia along left medial cheek, offered topical Tazorac to help treat the lesion    -Refill of Tazorac 0.05% gel provided.    3) folliculitis:    -Clindamycin 1% lotion twice daily during active flares    4) Cherry angioma:    Provider noted cherry angioma along right medial breast and multiple additional lesions along trunk.  Reassurance provided    5) seborrheic keratoses:    Reassurance provided, discussed age-related benign nature and counseled about treatment options to include cryotherapy versus watchful waiting.  After discussion, patient opts for watchful waiting.    -Advised to return to clinic if any change.    6) other specified counseling:    -Recommended use of sunscreen SPF 50 or higher with physical blockers.    On 5/12/2025, Lillie VIVEROS scribed the services personally performed by Agapito Dover MD     The documentation recorded by the scribe accurately and completely reflects the service(s) I personally performed and the decisions made by me.      Agapito Dover MD    Clear